# Patient Record
Sex: MALE | Race: WHITE | NOT HISPANIC OR LATINO | Employment: UNEMPLOYED | URBAN - METROPOLITAN AREA
[De-identification: names, ages, dates, MRNs, and addresses within clinical notes are randomized per-mention and may not be internally consistent; named-entity substitution may affect disease eponyms.]

---

## 2018-07-05 ENCOUNTER — HOSPITAL ENCOUNTER (EMERGENCY)
Facility: CLINIC | Age: 48
Discharge: HOME | End: 2018-07-05
Attending: EMERGENCY MEDICINE | Admitting: EMERGENCY MEDICINE
Payer: MEDICARE

## 2018-07-05 VITALS
SYSTOLIC BLOOD PRESSURE: 124 MMHG | OXYGEN SATURATION: 96 % | DIASTOLIC BLOOD PRESSURE: 61 MMHG | TEMPERATURE: 97.4 F | RESPIRATION RATE: 18 BRPM

## 2018-07-05 DIAGNOSIS — T78.40XA ALLERGIC REACTION, INITIAL ENCOUNTER: ICD-10-CM

## 2018-07-05 PROCEDURE — 25000128 H RX IP 250 OP 636: Performed by: EMERGENCY MEDICINE

## 2018-07-05 PROCEDURE — 99284 EMERGENCY DEPT VISIT MOD MDM: CPT | Mod: 25

## 2018-07-05 PROCEDURE — A9270 NON-COVERED ITEM OR SERVICE: HCPCS | Mod: GY | Performed by: EMERGENCY MEDICINE

## 2018-07-05 PROCEDURE — 25000132 ZZH RX MED GY IP 250 OP 250 PS 637: Performed by: EMERGENCY MEDICINE

## 2018-07-05 PROCEDURE — 96374 THER/PROPH/DIAG INJ IV PUSH: CPT

## 2018-07-05 PROCEDURE — 96375 TX/PRO/DX INJ NEW DRUG ADDON: CPT

## 2018-07-05 PROCEDURE — 25000125 ZZHC RX 250: Performed by: EMERGENCY MEDICINE

## 2018-07-05 RX ORDER — ACETAMINOPHEN 500 MG
500 TABLET ORAL EVERY 4 HOURS PRN
Status: DISCONTINUED | OUTPATIENT
Start: 2018-07-05 | End: 2018-07-05 | Stop reason: HOSPADM

## 2018-07-05 RX ORDER — METHYLPREDNISOLONE SODIUM SUCCINATE 125 MG/2ML
125 INJECTION, POWDER, LYOPHILIZED, FOR SOLUTION INTRAMUSCULAR; INTRAVENOUS ONCE
Status: COMPLETED | OUTPATIENT
Start: 2018-07-05 | End: 2018-07-05

## 2018-07-05 RX ORDER — DIPHENHYDRAMINE HYDROCHLORIDE 50 MG/ML
25 INJECTION INTRAMUSCULAR; INTRAVENOUS ONCE
Status: COMPLETED | OUTPATIENT
Start: 2018-07-05 | End: 2018-07-05

## 2018-07-05 RX ORDER — PREDNISONE 20 MG/1
TABLET ORAL
Qty: 10 TABLET | Refills: 0 | Status: SHIPPED | OUTPATIENT
Start: 2018-07-05

## 2018-07-05 RX ORDER — EPINEPHRINE 0.3 MG/.3ML
0.3 INJECTION SUBCUTANEOUS PRN
Qty: 0.6 ML | Refills: 0 | Status: SHIPPED | OUTPATIENT
Start: 2018-07-05

## 2018-07-05 RX ADMIN — DIPHENHYDRAMINE HYDROCHLORIDE 25 MG: 50 INJECTION, SOLUTION INTRAMUSCULAR; INTRAVENOUS at 16:40

## 2018-07-05 RX ADMIN — METHYLPREDNISOLONE SODIUM SUCCINATE 125 MG: 125 INJECTION, POWDER, FOR SOLUTION INTRAMUSCULAR; INTRAVENOUS at 16:40

## 2018-07-05 RX ADMIN — FAMOTIDINE 20 MG: 10 INJECTION, SOLUTION INTRAVENOUS at 16:40

## 2018-07-05 RX ADMIN — ACETAMINOPHEN 500 MG: 500 TABLET, FILM COATED ORAL at 16:40

## 2018-07-05 ASSESSMENT — ENCOUNTER SYMPTOMS: NAUSEA: 0

## 2018-07-05 NOTE — DISCHARGE INSTRUCTIONS
Prednisone to decrease inflammation for the next couple of days.    Refill of your epinephrine pen.    Late allergic reactions can occur- even with prior treatment.    So if you have chest pain, shortness of breath, swelling of the face, difficulty breathing, or any other problems- come back to the ED.    Mild itching- ice to the sting sites and ok benadryl every 6-8 hrs.

## 2018-07-05 NOTE — ED PROVIDER NOTES
History     Chief Complaint:  Wasp sting    HPI   Alvaro Gil is a 47 year old male with a known bee sting allergy who presents to the emergency department today for evaluation of a wasp sting. The patient reports that he was outside working on his driveway today when he hit a trailer, noticed wasps flying out at him, and was subsequently stung on his left wrist and right arm. As the patient has a history of difficulty breathing and increased swelling following bee stings, he states that he utilized his epi pen immediately following the sting. He currently endorses pain at the site of his stings, with the left wrist stronger than the right arm. He denies any difficulty breathing, tightness, nausea, or loss of consciousness prior to epi pen administration.   No vomiting.    Allergies:  No Known Drug Allergies  Bees    Medications:    Epi pen    Past Medical History:    Anaphylaxis to bee stings- x 1    Past Surgical History:    Surgical history reviewed. No pertinent surgical history.    Family History:    Family history reviewed. No pertinent family history.     Social History:  The patient was accompanied to the ED by his mother.  Smoking Status: Never Smoker  Smokeless Tobacco: Never Used  Marital Status:  Single     Review of Systems   Respiratory:        No difficulty breathing  No airway swelling or tightness   Gastrointestinal: Negative for nausea.   Skin:        Pain near stings on left wrist and right arm   Neurological:        No loss of consciousness    All other systems reviewed and are negative.  bee sting x2. Pt stung on L wrist and R arm. Pt states has hx of reactions to bee stings. Pt administered x1 epi pen. Denies SOB, or airway involvement. Denies itching or hives at this time.    Physical Exam     Patient Vitals for the past 24 hrs:   BP Temp Temp src Heart Rate Resp SpO2   07/05/18 1845 - - - - - 96 %   07/05/18 1830 124/61 - - - - 95 %   07/05/18 1815 - - - - - 99 %   07/05/18 1800 114/46  - - - - 97 %   07/05/18 1745 - - - - - 95 %   07/05/18 1730 116/63 - - - - 99 %   07/05/18 1715 - - - - - 100 %   07/05/18 1700 116/67 - - - - 99 %   07/05/18 1651 - 97.4  F (36.3  C) Oral - - -   07/05/18 1630 127/63 - - - - 95 %   07/05/18 1615 - - - - - 95 %   07/05/18 1600 128/66 - - - - 94 %   07/05/18 1557 122/72 97.3  F (36.3  C) - 87 18 99 %     Physical Exam   Musculoskeletal:        Arms:    GEN: patient smiling, no distress  HEAD: atraumatic, normocephalic  EYES: pupils reactive,  conjunctivae normal  ENT: TMs flat and white bilaterally, oropharynx normal with no erythema or exudate, mucus membranes moist, no swelling.  Intra oral region is normal.  NECK: no cervical LAD, no stridor.  RESPIRATORY: no tachypnea, breath sounds clear to auscultation (no rales, wheezes, rhonchi), chest wall nontender, normal phonation  CVS: normal S1/S2, no murmurs/rubs/gallops  ABDOMEN: soft, nontender, no masses or organomegaly, no rebound, decreased bowel sounds  BACK: no CVAT  EXTREMITIES: intact pulses x 4, full range of motion at joints, no edema  MUSCULOSKELETAL: no deformities  SKIN: warm and dry, sting bite location on right proximal forearm, also left ulnar wrist area.  No other hives or angioedema.  NEURO: Overall symmetrical exam  HEME: no bruising or petechiae/contusions  LYMPH: no lymphadenopathy    Emergency Department Course     Interventions:  1640 Tylenol 500 mg Oral  1640 Benadryl 25 mg IV  1640 Pepcid 20 mg IV  1640 Solu-Medrol 125 mg IV  [place]  Cardiac/Sp02 monitoring    Emergency Department Course:    1601 Nursing notes and vitals reviewed.    1603 I performed an exam of the patient as documented above.     1820 Recheck.    1839 Recheck.    1855 I personally answered all related questions prior to discharge.    /61  Temp 97.4  F (36.3  C) (Oral)  Resp 18  SpO2 96%  Recheck, tolerating PO challenge    Impression & Plan      Medical Decision Making:  Alvaro RM Gil is a 47 year old male who  does have a past history of allergic reactions and anaphylaxis who presents with sting sites on his left hand and also his right forearm. He had self administered an epinephrine and was brought in by EMS. Patient was given prednisone, in addition to Benadryl. He has been observed here for several hours in the ER and has improved. I will refill his epinephrine. He was given H2 blockers and will go home to follow up with his primary care provider. He is aware that there can be late and re-emergent reactions (ie biphasic allergic reaction). He should come back to the ER if any difficulty breathing, increasing hives, or any other problems.   Mom is at the bedside and understands these instructions.  No sign of allergic reaction or anaphylaxis at this time.  Normal breathing and lung exam and saturations.    Diagnosis:    ICD-10-CM    1. Allergic reaction, initial encounter T78.40XA      Disposition:   The patient is discharged to home.    Discharge Medications:  Discharge Medication List as of 7/5/2018  6:45 PM      START taking these medications    Details   EPINEPHrine (EPIPEN/ADRENACLICK/OR ANY BX GENERIC EQUIV) 0.3 MG/0.3ML injection 2-pack Inject 0.3 mLs (0.3 mg) into the muscle as needed for anaphylaxis, Disp-0.6 mL, R-0, Local Print      predniSONE (DELTASONE) 20 MG tablet Take two tablets (= 40mg) each day for 5 (five) days, Disp-10 tablet, R-0, Local Print           Instructions to patient:  Prednisone to decrease inflammation for the next couple of days.    Refill of your epinephrine pen.    Late allergic reactions can occur- even with prior treatment.    So if you have chest pain, shortness of breath, swelling of the face, difficulty breathing, or any other problems- come back to the ED.    Mild itching- ice to the sting sites and ok benadryl every 6-8 hrs.    Scribe Disclosure:  I, Bella Gregory, am serving as a scribe at 4:22 PM on 7/5/2018 to document services personally performed by Johana Hill MD  based on my observations and the provider's statements to me.    [facility] EMERGENCY DEPARTMENT       Johana Hill MD  07/05/18 1916

## 2018-07-05 NOTE — ED AVS SNAPSHOT
[facility] Emergency Department    [address]    [facility] [address]    Phone:  [phone]    Fax:  [phone]                                       Alvaro Gil   MRN: [id number]    Department:  [facility] Emergency Department   Date of Visit:  7/5/2018           After Visit Summary Signature Page     I have received my discharge instructions, and my questions have been answered. I have discussed any challenges I see with this plan with the nurse or doctor.    ..........................................................................................................................................  Patient/Patient Representative Signature      ..........................................................................................................................................  Patient Representative Print Name and Relationship to Patient    ..................................................               ................................................  Date                                            Time    ..........................................................................................................................................  Reviewed by Signature/Title    ...................................................              ..............................................  Date                                                            Time

## 2018-07-05 NOTE — ED AVS SNAPSHOT
[facility] Emergency Department    [address]    [facility] [address]    Phone:  [phone]    Fax:  [phone]                                       Alvaro Gil   MRN: [id number]    Department:  [facility] Emergency Department   Date of Visit:  7/5/2018           Patient Information     Date Of Birth          1970        Your diagnoses for this visit were:     Allergic reaction, initial encounter        You were seen by Johana Hill MD.      Follow-up Information     Follow up with Clinic, [facility].    Contact information:    [address] [address] [phone]          Discharge Instructions       Prednisone to decrease inflammation for the next couple of days.    Refill of your epinephrine pen.    Late allergic reactions can occur- even with prior treatment.    So if you have chest pain, shortness of breath, swelling of the face, difficulty breathing, or any other problems- come back to the ED.    Mild itching- ice to the sting sites and ok benadryl every 6-8 hrs.    Discharge References/Attachments     ALLERGIC REACTIONS, GENERAL (ENGLISH)    ALLERGIC REACTION, INSECT (GENERAL) (ENGLISH)      24 Hour Appointment Hotline       To make an appointment at any [place] clinic, call [phone] ([phone]). If you don't have a family doctor or clinic, we will help you find one. [place] clinics are conveniently located to serve the needs of you and your family.             Review of your medicines      START taking        Dose / Directions Last dose taken    EPINEPHrine 0.3 MG/0.3ML injection 2-pack   Commonly known as:  EPIPEN/ADRENACLICK/or ANY BX GENERIC EQUIV   Dose:  0.3 mg   Quantity:  0.6 mL        Inject 0.3 mLs (0.3 mg) into the muscle as needed for anaphylaxis   Refills:  0        predniSONE 20 MG tablet   Commonly known as:  DELTASONE   Quantity:  10 tablet        Take two tablets (=  40mg) each day for 5 (five) days   Refills:  0                Prescriptions were sent or printed at these locations (2 Prescriptions)                   Other Prescriptions                Printed at Department/Unit printer (2 of 2)         EPINEPHrine (EPIPEN/ADRENACLICK/OR ANY BX GENERIC EQUIV) 0.3 MG/0.3ML injection 2-pack               predniSONE (DELTASONE) 20 MG tablet                Orders Needing Specimen Collection     None      Pending Results     No orders found from 7/3/2018 to 7/6/2018.            Pending Culture Results     No orders found from 7/3/2018 to 7/6/2018.            Pending Results Instructions     If you had any lab results that were not finalized at the time of your Discharge, you can call the ED Lab Result RN at [phone]. You will be contacted by this team for any positive Lab results or changes in treatment. The nurses are available 7 days a week from 10A to 6:30P.  You can leave a message 24 hours per day and they will return your call.        Test Results From Your Hospital Stay               Clinical Quality Measure: Blood Pressure Screening     Your blood pressure was checked while you were in the emergency department today. The last reading we obtained was  BP: 116/67 . Please read the guidelines below about what these numbers mean and what you should do about them.  If your systolic blood pressure (the top number) is less than 120 and your diastolic blood pressure (the bottom number) is less than 80, then your blood pressure is normal. There is nothing more that you need to do about it.  If your systolic blood pressure (the top number) is 120-139 or your diastolic blood pressure (the bottom number) is 80-89, your blood pressure may be higher than it should be. You should have your blood pressure rechecked within a year by a primary care provider.  If your systolic blood pressure (the top number) is 140 or greater or your diastolic blood pressure (the bottom number) is 90 or  "greater, you may have high blood pressure. High blood pressure is treatable, but if left untreated over time it can put you at risk for heart attack, stroke, or kidney failure. You should have your blood pressure rechecked by a primary care provider within the next 4 weeks.  If your provider in the emergency department today gave you specific instructions to follow-up with your doctor or provider even sooner than that, you should follow that instruction and not wait for up to 4 weeks for your follow-up visit.        Thank you for choosing [facility]       Thank you for choosing [facility] for your care. Our goal is always to provide you with excellent care. Hearing back from our patients is one way we can continue to improve our services. Please take a few minutes to complete the written survey that you may receive in the mail after you visit with us. Thank you!        [employer] Information     [employer] lets you send messages to your doctor, view your test results, renew your prescriptions, schedule appointments and more. To sign up, go to [email or web address] . Click on \"Log in\" on the left side of the screen, which will take you to the Welcome page. Then click on \"Sign up Now\" on the right side of the page.     You will be asked to enter the access code listed below, as well as some personal information. Please follow the directions to create your username and password.     Your access code is: [email or web address]  Expires: 10/3/2018  6:45 PM     Your access code will  in 90 days. If you need help or a new code, please call your [facility] clinic or [phone].        Care EveryWhere ID     This is your Care EveryWhere ID. This could be used by other organizations to access your [facility] medical records  [id number]        Equal Access to Services     JEANETTE MORALES : Tremayne Flowers, rosalinda silverman, aydee macdonald. So [facility] [phone].    ATENCIÓN: " Si habla burt, tiene a triana disposición servicios gratuitos de asistencia lingüística. Llame al [phone].    We comply with applicable federal civil rights laws and [place] laws. We do not discriminate on the basis of race, color, national origin, age, disability, sex, sexual orientation, or gender identity.            After Visit Summary       This is your record. Keep this with you and show to your community pharmacist(s) and doctor(s) at your next visit.

## 2018-07-05 NOTE — ED TRIAGE NOTES
Pt brought in by EMS for bee sting x2. Pt stung on L wrist and R arm. Pt states has hx of reactions to bee stings. Pt administered x1 epi pen. Denies SOB, or airway involvement. Denies itching or hives at this time. ABCs intact.

## 2018-07-05 NOTE — ED NOTES
Bed: ED03  Expected date: 7/5/18  Expected time:   Means of arrival: Ambulance  Comments:  [facility]6

## 2019-02-03 ENCOUNTER — HOSPITAL ENCOUNTER (EMERGENCY)
Facility: CLINIC | Age: 49
Discharge: HOME | End: 2019-02-03
Attending: EMERGENCY MEDICINE | Admitting: EMERGENCY MEDICINE
Payer: COMMERCIAL

## 2019-02-03 ENCOUNTER — APPOINTMENT (OUTPATIENT)
Dept: CT IMAGING | Facility: CLINIC | Age: 49
End: 2019-02-03
Payer: COMMERCIAL

## 2019-02-03 VITALS
SYSTOLIC BLOOD PRESSURE: 154 MMHG | OXYGEN SATURATION: 100 % | DIASTOLIC BLOOD PRESSURE: 98 MMHG | HEIGHT: 69 IN | TEMPERATURE: 98 F | RESPIRATION RATE: 18 BRPM | HEART RATE: 75 BPM

## 2019-02-03 DIAGNOSIS — S01.01XA SCALP LACERATION, INITIAL ENCOUNTER: ICD-10-CM

## 2019-02-03 PROCEDURE — 12002 RPR S/N/AX/GEN/TRNK2.6-7.5CM: CPT

## 2019-02-03 PROCEDURE — 99284 EMERGENCY DEPT VISIT MOD MDM: CPT | Mod: 25

## 2019-02-03 PROCEDURE — 70450 CT HEAD/BRAIN W/O DYE: CPT

## 2019-02-03 RX ORDER — LIDOCAINE HYDROCHLORIDE AND EPINEPHRINE 10; 10 MG/ML; UG/ML
INJECTION, SOLUTION INFILTRATION; PERINEURAL
Status: DISCONTINUED
Start: 2019-02-03 | End: 2019-02-03 | Stop reason: HOSPADM

## 2019-02-03 ASSESSMENT — ENCOUNTER SYMPTOMS
WOUND: 1
NAUSEA: 0
DIZZINESS: 0
HEADACHES: 1
NECK PAIN: 0
VOMITING: 0

## 2019-02-03 NOTE — ED AVS SNAPSHOT
[facility] Emergency Department  [address]  [facility] [address]  Phone:  [phone]  Fax:  [phone]                                    Alvaro Gil   MRN: [id number]    Department:  [facility] Emergency Department   Date of Visit:  2/3/2019           After Visit Summary Signature Page    I have received my discharge instructions, and my questions have been answered. I have discussed any challenges I see with this plan with the nurse or doctor.    ..........................................................................................................................................  Patient/Patient Representative Signature      ..........................................................................................................................................  Patient Representative Print Name and Relationship to Patient    ..................................................               ................................................  Date                                   Time    ..........................................................................................................................................  Reviewed by Signature/Title    ...................................................              ..............................................  Date                                               Time          22EPIC Rev 08/18

## 2019-02-03 NOTE — ED PROVIDER NOTES
"  History     Chief Complaint:  Head Injury    HPI   Alvaro Gil is a 48 year old, otherwise healthy male who presents for evaluation of a head injury after a fall. He reports he was moving his ice house this morning around 1030 when he slipped and fell backwards onto the ice. The fall was unwitnessed, but the patient reports he lost consciousness for a couple seconds. By the time his brother got to his side, he was awake and bleeding from a laceration to his scalp, so they started applying pressure to the wound with a cloth and came directly to the ED. Here, he reports a headache. He denies any neck pain, nausea, or vomiting. He denies any symptoms preceding the fall, including chest pain or dizziness.     Allergies:  NKDA    Medications:    Prednisone    Past Medical History:    The patient denies any significant past medical history.    Past Surgical History:    The patient does not have any pertinent past surgical history.    Family History:    No past pertinent family history.    Social History:  Marital Status:  Single [1]  Presents with family.   Negative for tobacco use.     Review of Systems   Cardiovascular: Negative for chest pain.   Gastrointestinal: Negative for nausea and vomiting.   Musculoskeletal: Negative for neck pain.   Skin: Positive for wound.   Neurological: Positive for headaches. Negative for dizziness.   All other systems reviewed and are negative.    Physical Exam     Patient Vitals for the past 24 hrs:   BP Temp Temp src Pulse Heart Rate Resp SpO2 Height   02/03/19 1304 (!) 154/98 -- -- 75 75 18 100 % --   02/03/19 1131 -- 98  F (36.7  C) Oral -- -- -- -- --   02/03/19 1110 143/89 -- -- -- 78 20 99 % 1.753 m (5' 9\")     Physical Exam  General: Patient is alert and interactive when I enter the room  Head:  The scalp, face, and head appear normal  Eyes:  Conjunctivae are normal  ENT:    The nose is normal    Pinnae are normal    External acoustic canals are normal, large hematoma and 6 " cm laceration on the posterior scalp with exposed subcutaneous tissue  Neck:  Trachea midline, no midline cervical spine tenderness  CV:  Pulses are normal.    Resp:  No respiratory distress   Abdomen:      Soft, non-tender, non-distended  Musc:  Normal muscular tone    No major joint effusions    No asymmetric leg swelling    Good capillary refill noted  Skin:  No rash or lesions noted  Neuro:  Speech is normal and fluent. Face is symmetric.     Moving all extremities well.   Psych: Awake. Alert.  Normal affect.  Appropriate interactions.    Emergency Department Course   Imaging:  Radiographic findings were communicated with the patient who voiced understanding of the findings.  CT Head without contrast:   1. No evidence of acute intracranial hemorrhage, mass, or herniation.  2. Left posterior scalp soft tissue injury and hematoma without  underlying fracture, as per radiology.    Procedures:    Narrative: Procedure: Laceration Repair        LACERATION:  A simple clean 6.0 cm laceration.      LOCATION:  Posterior scalp      FUNCTION:  Sensation and circulation are intact.      ANESTHESIA:  Local using lidocaine 1% with epiniephrine       PREPARATION:  Irrigation and Scrubbing with Normal Saline and Shur Clens      DEBRIDEMENT:  no debridement      CLOSURE:  Wound was closed with One Layer.  Skin closed with 14 x 4.0 Prolene using interrupted sutures.    Emergency Department Course:  Nursing notes and vitals reviewed.   (1108) I performed an exam of the patient as documented above.      Medicine administered as documented above.     The patient was sent for a head CT while in the emergency department, findings above.      (1133) I rechecked the patient and discussed the results of his workup thus far. I also applied the anesthetic to his wound at this time.     (1158) I performed a laceration repair, as noted above. There were no complications of the procedure.     Findings and plan explained to the Patient. Patient  discharged home with instructions regarding supportive care, medications, and reasons to return. The importance of close follow-up was reviewed.      I personally reviewed the imaging results with the Patient and answered all related questions prior to discharge.        Impression & Plan      Medical Decision Making:  The patient presents for evaluation of a head injury.  Due to the patient's mechanism of fall and active bleeding in the ED, they were at risk of intracranial hemorrhage.  CT of the head was obtained and is negative for acute ICH or skull fracture.  The CT head result was discussed with the patient.   Head injury precautions and reasons to return to the emergency department were discussed. The patient's head laceration was repaired as noted above. There was no evidence of deep structure injury, skull fracture or violation of the galea.   Based on a full exam, I did not have concern for any additional traumatic injuries. The patient describes a mechanical accident and therefore no additional evaluation for metabolic or cardiac etiology was warranted at this time. Patient was given wound care instructions and will follow up with PCP for suture removal.    Critical Care time:  none    Diagnosis:    ICD-10-CM    1. Scalp laceration, initial encounter S01.01XA        Disposition:  discharged to home    Discharge Medications:  There were no discharge medications.     Scribe Disclosure:  I, Beth Rae, am serving as a scribe on 2/3/2019 at 11:08 AM to personally document services performed by Carri Lane MD based on my observations and the provider's statements to me.     Beth Rae  2/3/2019   [facility] EMERGENCY DEPARTMENT       Carri Lane MD  02/05/19 0152

## 2019-02-03 NOTE — ED TRIAGE NOTES
Pt was outside moving a fish house and fell with injury to back of head.  He thinks may have had LOC.  Brother noted pt down and brought to ED.  Pt is alert, oriented and responds appropriately to questions.  Direct pressure applied with cloth.

## 2021-08-02 ENCOUNTER — HOSPITAL ENCOUNTER (EMERGENCY)
Facility: CLINIC | Age: 51
Discharge: HOME | End: 2021-08-02
Attending: PHYSICIAN ASSISTANT | Admitting: PHYSICIAN ASSISTANT
Payer: COMMERCIAL

## 2021-08-02 ENCOUNTER — APPOINTMENT (OUTPATIENT)
Dept: GENERAL RADIOLOGY | Facility: CLINIC | Age: 51
End: 2021-08-02
Attending: PHYSICIAN ASSISTANT
Payer: COMMERCIAL

## 2021-08-02 VITALS
OXYGEN SATURATION: 98 % | DIASTOLIC BLOOD PRESSURE: 74 MMHG | HEART RATE: 76 BPM | BODY MASS INDEX: 46.52 KG/M2 | RESPIRATION RATE: 20 BRPM | TEMPERATURE: 98.6 F | WEIGHT: 315 LBS | SYSTOLIC BLOOD PRESSURE: 163 MMHG

## 2021-08-02 DIAGNOSIS — M75.82 ROTATOR CUFF TENDONITIS, LEFT: ICD-10-CM

## 2021-08-02 PROCEDURE — 99283 EMERGENCY DEPT VISIT LOW MDM: CPT

## 2021-08-02 PROCEDURE — 73030 X-RAY EXAM OF SHOULDER: CPT | Mod: LT

## 2021-08-02 RX ORDER — CELECOXIB 200 MG/1
200 CAPSULE ORAL 2 TIMES DAILY
Qty: 20 CAPSULE | Refills: 0 | Status: SHIPPED | OUTPATIENT
Start: 2021-08-02 | End: 2021-08-12

## 2021-08-02 RX ORDER — CYCLOBENZAPRINE HCL 10 MG
10 TABLET ORAL 3 TIMES DAILY PRN
Qty: 20 TABLET | Refills: 0 | Status: SHIPPED | OUTPATIENT
Start: 2021-08-02 | End: 2021-08-08

## 2021-08-02 ASSESSMENT — ENCOUNTER SYMPTOMS
MYALGIAS: 1
SHORTNESS OF BREATH: 0
NUMBNESS: 0

## 2021-08-02 NOTE — ED TRIAGE NOTES
Pt arrives to the ED due to left shoulder pain that began around 0430. Pt states that the pain starts near his neck and goes down to close to his elbow. Pt states he did lift something heavy on Friday, unsure if it is related. Pain 10/10. Pain worse with movement.

## 2021-08-02 NOTE — ED PROVIDER NOTES
History   Chief Complaint:  Shoulder Pain       HPI   Alvaro Gil is a 50 year old male with history of tourette's syndrome who presents with shoulder pain. The patient reports onset of left sided shoulder pain more towards the back this morning around 0430. He reports that he felt fine yesterday and denies any activity over the weekend that could have caused the pain. He states his shoulder down feels fine with no numbness or weakness. No chest pain or shortness of breath. He notes that he has been lifting cast iron rims recently. He reports his fiance's arm was swollen this morning and he thinks it may have been caused by a spider bite.     Review of Systems   Respiratory: Negative for shortness of breath.    Cardiovascular: Negative for chest pain.   Musculoskeletal: Positive for myalgias (Shoulder ).   Neurological: Negative for numbness.   All other systems reviewed and are negative.    Allergies:  The patient has no known allergies.     Medications:  Epinephrine     Past Medical History:    Tourette's   Developmental delay       Family History:    Diabetes    Social History:  The patient presents alone.  He has a fiance.     Physical Exam     Patient Vitals for the past 24 hrs:   BP Temp Temp src Pulse Resp SpO2 Weight   08/02/21 1219 (!) 163/74 98.6  F (37  C) Oral 76 20 98 % 142.9 kg (315 lb)     Physical Exam  Vitals and nursing note reviewed.   Constitutional:       General: He is not in acute distress.     Appearance: He is not diaphoretic.   HENT:      Head: Normocephalic and atraumatic.   Eyes:      General: No scleral icterus.  Cardiovascular:      Rate and Rhythm: Normal rate and regular rhythm.      Pulses: Normal pulses.      Heart sounds: Normal heart sounds.   Pulmonary:      Effort: Pulmonary effort is normal. No respiratory distress.      Breath sounds: Normal breath sounds.   Musculoskeletal:         General: Tenderness present.      Left shoulder: Tenderness present. No swelling,  deformity or bony tenderness. Normal range of motion.      Left elbow: No swelling. Normal range of motion. No tenderness.      Cervical back: Normal. No tenderness or bony tenderness. Normal range of motion.   Skin:     General: Skin is warm.      Findings: No rash.   Neurological:      Mental Status: He is alert.       Emergency Department Course     Imaging:  XR Shoulder Left G/E 3 Views:  Negative exam, as per radiology.     Emergency Department Course:    Reviewed:  I reviewed nursing notes, vitals, past medical history and care everywhere    Assessments:  1326 I obtained history and examined the patient as noted above.   1410 I rechecked the patient and explained findings.     Disposition:  The patient was discharged to home.       Impression & Plan     Medical Decision Making:  Denies symptoms of CAD/ACS, pulmonary embolism, pneumothorax, rib fracture, CHF. Not most c/w cervical radiculopathy. Clinically c/w rotator cuff tendonitis. Imaging demonstrates no osseous abnormality. No neurovascular compromised of the extremities.       Diagnosis:    ICD-10-CM    1. Rotator cuff tendonitis, left  M75.82        Discharge Medications:  New Prescriptions    CELECOXIB (CELEBREX) 200 MG CAPSULE    Take 1 capsule (200 mg) by mouth 2 times daily for 10 days    CYCLOBENZAPRINE (FLEXERIL) 10 MG TABLET    Take 1 tablet (10 mg) by mouth 3 times daily as needed for muscle spasms       Scribe Disclosure:  I, Hamlet Pierre, am serving as a scribe at 1:17 PM on 8/2/2021 to document services personally performed by Javan Hamlin PA-C based on my observations and the provider's statements to me.        Javan Hamlin PA-C  08/02/21 [id number]

## 2023-06-29 ENCOUNTER — HOSPITAL ENCOUNTER (EMERGENCY)
Facility: CLINIC | Age: 53
Discharge: HOME | End: 2023-06-29
Attending: EMERGENCY MEDICINE | Admitting: EMERGENCY MEDICINE
Payer: COMMERCIAL

## 2023-06-29 VITALS
DIASTOLIC BLOOD PRESSURE: 84 MMHG | HEART RATE: 97 BPM | OXYGEN SATURATION: 99 % | TEMPERATURE: 97.2 F | RESPIRATION RATE: 16 BRPM | SYSTOLIC BLOOD PRESSURE: 150 MMHG

## 2023-06-29 DIAGNOSIS — S81.812A LACERATION OF LEFT LOWER LEG, INITIAL ENCOUNTER: ICD-10-CM

## 2023-06-29 PROCEDURE — 99283 EMERGENCY DEPT VISIT LOW MDM: CPT | Mod: 25

## 2023-06-29 PROCEDURE — 12004 RPR S/N/AX/GEN/TRK7.6-12.5CM: CPT

## 2023-06-29 PROCEDURE — 99283 EMERGENCY DEPT VISIT LOW MDM: CPT

## 2023-06-29 RX ORDER — CEPHALEXIN 500 MG/1
500 CAPSULE ORAL 4 TIMES DAILY
Qty: 20 CAPSULE | Refills: 0 | Status: SHIPPED | OUTPATIENT
Start: 2023-06-29 | End: 2023-07-04

## 2023-06-29 RX ORDER — LIDOCAINE HYDROCHLORIDE AND EPINEPHRINE 10; 10 MG/ML; UG/ML
INJECTION, SOLUTION INFILTRATION; PERINEURAL
Status: DISCONTINUED
Start: 2023-06-29 | End: 2023-06-29 | Stop reason: HOSPADM

## 2023-06-29 ASSESSMENT — ACTIVITIES OF DAILY LIVING (ADL): ADLS_ACUITY_SCORE: 33

## 2023-06-29 NOTE — ED TRIAGE NOTES
12 cm laceration from sheet metal to LLE just PTA.  VSS on RA.  CMS intact, blood is oozing from the site. Last Tdap 5/3/2016.

## 2023-06-30 NOTE — ED PROVIDER NOTES
History     Chief Complaint:  Laceration       HPI   Alvaro Gil is a 52 year old male without significant past medical history presenting for left lower leg laceration sustained when he backed into sheet-metal that he was describing.  It was not brittle and was thick enough that he does not think any of it broke off.  Tetanus is up-to-date.  He denies any numbness or tingling of his foot.      Independent Historian:   None - Patient Only    Review of External Notes:   None       Medications:    cephALEXin (KEFLEX) 500 MG capsule  celecoxib (CELEBREX) 200 MG capsule  EPINEPHrine (EPIPEN/ADRENACLICK/OR ANY BX GENERIC EQUIV) 0.3 MG/0.3ML injection 2-pack  predniSONE (DELTASONE) 20 MG tablet        Past Medical History:    No past medical history on file.    Past Surgical History:    No past surgical history on file.     Physical Exam   Patient Vitals for the past 24 hrs:   BP Temp Temp src Pulse Resp SpO2   06/29/23 1906 (!) 150/84 97.2  F (36.2  C) Temporal 97 16 99 %        Physical Exam  Constitutional: Alert, attentive, GCS 15   Eyes: EOM are normal, anicteric, conjugate gaze  CV: distal extremities warm, well perfused  Chest: Non-labored breathing on RA   MSK: Gaping curvilinear 12 cm left lower lateral leg laceration.  Distal foot is warm, well perfused with brisk DP and PT pulses.  Distal sensation is intact.  Dorsal and plantarflexion is intact.  Neurological: Alert, attentive, moving all extremities equally.   Skin: Skin is warm and dry.        Emergency Department Course       [facility]    -Laceration Repair    Date/Time: 6/29/2023 9:49 PM    Performed by: Paul Lynne MD  Authorized by: Paul Lynne MD    Risks, benefits and alternatives discussed.      ANESTHESIA (see MAR for exact dosages):     Anesthesia method:  Local infiltration    Local anesthetic:  Lidocaine 1% WITH epi (7 ml)  LACERATION DETAILS     Location:  Leg    Leg location:  L lower leg     Length (cm):  12    Depth (mm):  15    REPAIR TYPE:     Repair type:  Intermediate      EXPLORATION:     Hemostasis achieved with:  Direct pressure and epinephrine    Wound exploration: wound explored through full range of motion      Wound extent: areolar tissue violated, fascia violated and muscle damage      Wound extent: no foreign body, no nerve damage, no tendon damage and no vascular damage      Contaminated: no      TREATMENT:     Area cleansed with:  Betadine and saline    Amount of cleaning:  Extensive    Irrigation solution:  Sterile water    Irrigation volume:  3 L    Irrigation method:  Pressure wash    Visualized foreign bodies/material removed: no      SKIN REPAIR     Repair method:  Sutures    Suture size:  3-0    Suture material:  Nylon    Number of sutures:  24    APPROXIMATION     Approximation:  Close    POST-PROCEDURE DETAILS     Dressing:  Antibiotic ointment        PROCEDURE  Describe Procedure: I initially placed three 3-0 nylon tension sutures that were horizontal mattress.  I then placed 7 other horizontal mattress sutures and oversewed this with intermixed simple interrupted for a total of 20 4 sutures.  3 Steri-Strips were placed on the most distal aspect of the wound as this was superficial and not deep.         Emergency Department Course & Assessments:    Interventions:  Medications   lidocaine 1% with EPINEPHrine 1:100,000 1 %-1:[id number] injection (has no administration in time range)        Independent Interpretation (X-rays, CTs, rhythm strip):  None    Consultations/Discussion of Management or Tests:  None        Social Determinants of Health affecting care:   None    Disposition:  The patient was discharged to home.     Impression & Plan      Medical Decision Makin-year-old male without significant past medical history presenting for large curvilinear left lateral lower leg laceration sustained while he was scrapping and backed into a piece of sheet metal.  This was thick  enough that he is not aware that any edges broken off, x-ray deferred with low suspicion for foreign body.  On my exam, it does penetrate just down to the level of the muscle violating the fascia but does not penetrate into this deeply.  He has intact distal sensation with good plantar and dorsal flexion.  After copious irrigation, wound closed as above.  I will place him on 5 days of Keflex given penetration down to the muscle belly.  Wound care was reviewed, return precautions were reviewed and I recommended suture removal in 14 days.  With dorsal and plantarflexion after closure, the wound did not gape and did not appear to be under significant tension, I do not think he needs immobilization.  Tetanus is up-to-date    Diagnosis:    ICD-10-CM    1. Laceration of left lower leg, initial encounter  S81.812A            Discharge Medications:  New Prescriptions    CEPHALEXIN (KEFLEX) 500 MG CAPSULE    Take 1 capsule (500 mg) by mouth 4 times daily for 5 days        Paul Lynne MD  [employer]  9:46 PM 06/29/23        Paul Lynne MD  06/29/23 [id number]

## 2023-06-30 NOTE — DISCHARGE INSTRUCTIONS
As we reviewed, you should not put your leg submerged in water for any reason this includes pools, lakes, rivers, streams, hot tubs or bathtubs.  Is okay to shower just let the water run over it do not scrub it vigorously and do not put it directly under the showerhead.  You should watch for signs of infection which include increasing pain, redness, swelling or pus coming out.  If you have persistent numbness or tingling in your foot, you should make an appointment to follow-up with orthopedics.  You should be careful to keep your wound clean, covered, protected and history.  I would also avoid vigorous pushing up with your foot as this could tear the sutures.    You should elevate your leg to help with swelling which will help with the tension.

## 2025-08-07 ENCOUNTER — HOSPITAL ENCOUNTER (EMERGENCY)
Facility: CLINIC | Age: 55
Discharge: HOME | End: 2025-08-07
Attending: PHYSICIAN ASSISTANT | Admitting: PHYSICIAN ASSISTANT
Payer: COMMERCIAL

## 2025-08-07 ENCOUNTER — APPOINTMENT (OUTPATIENT)
Dept: GENERAL RADIOLOGY | Facility: CLINIC | Age: 55
End: 2025-08-07
Attending: PHYSICIAN ASSISTANT
Payer: COMMERCIAL

## 2025-08-07 VITALS
TEMPERATURE: 98.1 F | SYSTOLIC BLOOD PRESSURE: 149 MMHG | RESPIRATION RATE: 18 BRPM | BODY MASS INDEX: 46.65 KG/M2 | WEIGHT: 315 LBS | HEART RATE: 67 BPM | OXYGEN SATURATION: 98 % | HEIGHT: 69 IN | DIASTOLIC BLOOD PRESSURE: 77 MMHG

## 2025-08-07 DIAGNOSIS — S61.212A LACERATION OF RIGHT MIDDLE FINGER WITHOUT FOREIGN BODY WITHOUT DAMAGE TO NAIL, INITIAL ENCOUNTER: Primary | ICD-10-CM

## 2025-08-07 DIAGNOSIS — S61.411A LACERATION OF RIGHT PALM, INITIAL ENCOUNTER: ICD-10-CM

## 2025-08-07 PROCEDURE — 12004 RPR S/N/AX/GEN/TRK7.6-12.5CM: CPT | Mod: F7 | Performed by: PHYSICIAN ASSISTANT

## 2025-08-07 PROCEDURE — 99283 EMERGENCY DEPT VISIT LOW MDM: CPT | Performed by: PHYSICIAN ASSISTANT

## 2025-08-07 PROCEDURE — 250N000011 HC RX IP 250 OP 636: Performed by: PHYSICIAN ASSISTANT

## 2025-08-07 PROCEDURE — 73130 X-RAY EXAM OF HAND: CPT | Mod: RT

## 2025-08-07 PROCEDURE — 250N000013 HC RX MED GY IP 250 OP 250 PS 637: Performed by: PHYSICIAN ASSISTANT

## 2025-08-07 RX ORDER — CEPHALEXIN 500 MG/1
500 CAPSULE ORAL 4 TIMES DAILY
Qty: 20 CAPSULE | Refills: 0 | Status: SHIPPED | OUTPATIENT
Start: 2025-08-07 | End: 2025-08-12

## 2025-08-07 RX ORDER — CEPHALEXIN 500 MG/1
500 CAPSULE ORAL ONCE
Status: COMPLETED | OUTPATIENT
Start: 2025-08-07 | End: 2025-08-07

## 2025-08-07 RX ORDER — BUPIVACAINE HYDROCHLORIDE 5 MG/ML
10 INJECTION, SOLUTION EPIDURAL; INTRACAUDAL; PERINEURAL ONCE
Status: COMPLETED | OUTPATIENT
Start: 2025-08-07 | End: 2025-08-07

## 2025-08-07 RX ADMIN — CEPHALEXIN 500 MG: 500 CAPSULE ORAL at 19:37

## 2025-08-07 RX ADMIN — BUPIVACAINE HYDROCHLORIDE 50 MG: 5 INJECTION, SOLUTION EPIDURAL; INTRACAUDAL; PERINEURAL at 18:38

## 2025-08-07 ASSESSMENT — ACTIVITIES OF DAILY LIVING (ADL)
ADLS_ACUITY_SCORE: 41
ADLS_ACUITY_SCORE: 41

## 2025-08-07 ASSESSMENT — COLUMBIA-SUICIDE SEVERITY RATING SCALE - C-SSRS
2. HAVE YOU ACTUALLY HAD ANY THOUGHTS OF KILLING YOURSELF IN THE PAST MONTH?: NO
6. HAVE YOU EVER DONE ANYTHING, STARTED TO DO ANYTHING, OR PREPARED TO DO ANYTHING TO END YOUR LIFE?: NO
1. IN THE PAST MONTH, HAVE YOU WISHED YOU WERE DEAD OR WISHED YOU COULD GO TO SLEEP AND NOT WAKE UP?: NO